# Patient Record
Sex: FEMALE | Race: WHITE | NOT HISPANIC OR LATINO | URBAN - METROPOLITAN AREA
[De-identification: names, ages, dates, MRNs, and addresses within clinical notes are randomized per-mention and may not be internally consistent; named-entity substitution may affect disease eponyms.]

---

## 2017-02-03 NOTE — PATIENT DISCUSSION
Continue: PreserVision AREDS 2 (vit c,b-nw-wtpkf-lutein-zeaxan): capsule: 475-489-50-1 mg-unit-mg-mg 1 capsule twice a day by mouth

## 2017-02-03 NOTE — PATIENT DISCUSSION
Epiretinal Membrane Counseling: The diagnosis and natural history of epiretinal membrane was discussed with the patient including the risk of progression with retinal traction resulting in visual distortion. Patient instructed on how to do [address] to check for distortion in vision, The patient is instructed to call back immediately if any vision changes, and keep follow up as scheduled.

## 2017-07-12 NOTE — PATIENT DISCUSSION
EPIMACULAR MEMBRANE, OS:  PATIENT NOT FUNCTIONALLY BOTHERED. SURGERY NOT RECOMMENDED AT THIS TIME. RETURN AS SCHEDULED FOR OCT / EVALUATION.

## 2017-07-12 NOTE — PATIENT DISCUSSION
Continue: PreserVision AREDS 2 (vit c,y-mh-hjnta-lutein-zeaxan): capsule: 571-540-16-0 mg-unit-mg-mg

## 2018-02-16 NOTE — PATIENT DISCUSSION
Epiretinal Membrane Counseling: The diagnosis and natural history of epiretinal membrane was discussed with the patient including the risk of progression with retinal traction resulting in visual distortion. Patient instructed on how to do [address] to check for distortion in vision, The patient is instructed to call back immediately if any vision changes, and keep follow up as scheduled.

## 2018-02-16 NOTE — PATIENT DISCUSSION
Continue: PreserVision AREDS 2 (vit c,t-yz-yjhfl-lutein-zeaxan): capsule: 397-737-14-1 mg-unit-mg-mg

## 2018-07-19 NOTE — PATIENT DISCUSSION
Continue: PreserVision AREDS 2 (vit c,k-gp-lmpld-lutein-zeaxan): capsule: 109-671-60-2 mg-unit-mg-mg

## 2018-07-19 NOTE — PATIENT DISCUSSION
EYELID PTOSIS, OU:  NO DIPLOPIA, ANISOCORIA OR FATIGUING SYMPTOMS. EDEMA IS SUGGESTIVE OF CONTACT DERMATITIS, BUT PATIENT IS ASYMPTOMATIC. RECOMMEND COOL COMPRESSES BID TO QID. HOODING DUE TO EXCESS SKIN OD ALSO PRESENT. DISCUSSED REFERRAL TO OCULOPLASTIC SPECIALIST ONCE SWELLING RESOLVES. PATIENT CHOOSES TO MONITOR FOR NOW AND WILL CALL IF CHOOSES TO PURSUE OCULOPASTICS EVALUATION. ADVISED TO CALL IF SYMPTOMS ARE WORSENING OR NOT IMPROVING.

## 2018-08-10 NOTE — PATIENT DISCUSSION
Continue: PreserVision AREDS 2 (vit c,u-bp-ysewe-lutein-zeaxan): capsule: 132-403-10-2 mg-unit-mg-mg

## 2018-08-10 NOTE — PATIENT DISCUSSION
RETINA IS ATTACHED OU; NO HOLES OR TEARS SEEN ON DILATED EXAM TODAY.  RETINAL DETACHMENT SIGNS AND SYMPTOMS REVIEWED

## 2019-02-15 NOTE — PATIENT DISCUSSION
Continue: PreserVision AREDS 2 (vit c,d-sz-yhrfr-lutein-zeaxan): capsule: 847-726-15-0 mg-unit-mg-mg

## 2019-02-15 NOTE — PATIENT DISCUSSION
DERMATOCHALASIS / PTOSIS RUL AND BETHANIE :  VISUALLY SIGNIFICANT TO PATIENT. SCHEDULE WITH OCULOPLASTIC SPECIALIST IF PATIENT DESIRES.

## 2019-02-15 NOTE — PATIENT DISCUSSION
Epiretinal Membrane Counseling: The diagnosis and natural history of epiretinal membrane was discussed with the patient including the risk of progression with retinal traction resulting in visual distortion. Patient instructed on how to do [address] to check for distortion in vision, The patient is instructed to call back immediately if any vision changes, and keep follow up as scheduled.

## 2019-02-15 NOTE — PATIENT DISCUSSION
AMD (DRY), OU:  PRESCRIBE AREDS 2 VITAMINS AND RECOMMEND UV PROTECTION. PATIENT IS AWARE OF AMSLER GRID AND HOW TO CHECK FOR PROGRESSION. SMOKING AVOIDANCE ADVISED. RETURN FOR FOLLOW-UP AS SCHEDULED.

## 2019-08-28 NOTE — PATIENT DISCUSSION
STABLE NON-EXUDATIVE AMD OU: CONTINUE AREDS 2 VITAMINS / AMSLER GRID QD/ UV PROTECTION. SMOKING AVOIDANCE REVIEWED. RETURN FOR FOLLOW-UP AS SCHEDULED.

## 2019-08-28 NOTE — PATIENT DISCUSSION
Continue: PreserVision AREDS 2 (vit c,h-ap-mdgvh-lutein-zeaxan): capsule: 285-531-86-8 mg-unit-mg-mg

## 2020-02-03 NOTE — PATIENT DISCUSSION
Continue: PreserVision AREDS 2 (vit c,p-bt-nkcbq-lutein-zeaxan): capsule: 168-600-11-1 mg-unit-mg-mg

## 2020-02-03 NOTE — PATIENT DISCUSSION
Epiretinal Membrane Counseling: The diagnosis and natural history of epiretinal membrane was discussed with the patient including the risk of progression with retinal traction resulting in visual distortion. Patient instructed on how to do [address] to check for distortion in vision, The patient is instructed to call back immediately if any vision changes, and keep follow up as scheduled.

## 2020-07-22 NOTE — PATIENT DISCUSSION
Vitreous Syneresis Counseling: I have discussed the diagnosis of vitreous syneresis with the patient and the possibility of a posterior vitreous detachment (PVD), retinal tear or detachment. The signs/symptoms of a PVD, retinal tear or detachment were reviewed to include but not limited to redness, discharge, pain, increase or change in flashes of light, increase or change in floaters, decreased vision, part of the vision missing, veils or any other ocular concerns. I have further explained not all patients who develop a tear or detachment have notable symptoms, therefore, compliance with return visits are necessary. Return for follow-up as scheduled. 61

## 2020-08-26 NOTE — PATIENT DISCUSSION
Continue: PreserVision AREDS 2 (vit c,p-dn-kgabi-lutein-zeaxan): capsule: 654-873-54-5 mg-unit-mg-mg

## 2021-06-02 NOTE — PATIENT DISCUSSION
Continue: PreserVision AREDS 2 (vit c,a-ai-vdivr-lutein-zeaxan): capsule: 222-281-22-8 mg-unit-mg-mg

## 2022-04-26 ENCOUNTER — EMERGENCY VISIT (OUTPATIENT)
Dept: URBAN - METROPOLITAN AREA CLINIC 16 | Facility: CLINIC | Age: 47
End: 2022-04-26

## 2022-04-26 DIAGNOSIS — H16.142: ICD-10-CM

## 2022-04-26 PROCEDURE — 99213 OFFICE O/P EST LOW 20 MIN: CPT

## 2022-04-26 ASSESSMENT — VISUAL ACUITY
OD_CC: 20/20
OS_CC: 20/20-1

## 2022-06-23 RX ORDER — METHYLPREDNISOLONE 4 MG/1
TABLET ORAL
COMMUNITY

## 2022-09-15 ENCOUNTER — COMPREHENSIVE EXAM (OUTPATIENT)
Dept: URBAN - METROPOLITAN AREA CLINIC 16 | Facility: CLINIC | Age: 47
End: 2022-09-15

## 2022-09-15 DIAGNOSIS — Z01.00: ICD-10-CM

## 2022-09-15 DIAGNOSIS — H52.13: ICD-10-CM

## 2022-09-15 DIAGNOSIS — H10.45: ICD-10-CM

## 2022-09-15 DIAGNOSIS — Z46.0: ICD-10-CM

## 2022-09-15 PROCEDURE — 92310C CONTACT LENS 75

## 2022-09-15 PROCEDURE — 92014 COMPRE OPH EXAM EST PT 1/>: CPT

## 2022-09-15 PROCEDURE — 92015 DETERMINE REFRACTIVE STATE: CPT

## 2022-09-15 ASSESSMENT — TONOMETRY
OD_IOP_MMHG: 13
OS_IOP_MMHG: 14

## 2024-09-24 NOTE — PATIENT DISCUSSION
Epimacular Membrane Counseling: The cause and prognosis of the disease was discussed with the patient at length, including risk of blurred and distorted vision from this condition. Quality 110: Preventive Care And Screening: Influenza Immunization: Influenza Immunization Administered during Influenza season Quality 111:Pneumonia Vaccination Status For Older Adults: Pneumococcal vaccine (PPSV23) administered on or after patient’s 60th birthday and before the end of the measurement period Detail Level: Detailed